# Patient Record
Sex: MALE | Race: OTHER | Employment: FULL TIME | URBAN - METROPOLITAN AREA
[De-identification: names, ages, dates, MRNs, and addresses within clinical notes are randomized per-mention and may not be internally consistent; named-entity substitution may affect disease eponyms.]

---

## 2023-07-28 ENCOUNTER — HOSPITAL ENCOUNTER (EMERGENCY)
Facility: HOSPITAL | Age: 33
Discharge: HOME | End: 2023-07-28
Attending: EMERGENCY MEDICINE
Payer: COMMERCIAL

## 2023-07-28 VITALS
TEMPERATURE: 98 F | DIASTOLIC BLOOD PRESSURE: 76 MMHG | SYSTOLIC BLOOD PRESSURE: 128 MMHG | RESPIRATION RATE: 18 BRPM | WEIGHT: 202 LBS | BODY MASS INDEX: 31.71 KG/M2 | HEART RATE: 71 BPM | HEIGHT: 67 IN | OXYGEN SATURATION: 98 %

## 2023-07-28 DIAGNOSIS — L03.317 CELLULITIS OF BUTTOCK: Primary | ICD-10-CM

## 2023-07-28 PROCEDURE — 99283 EMERGENCY DEPT VISIT LOW MDM: CPT

## 2023-07-28 RX ORDER — SULFAMETHOXAZOLE AND TRIMETHOPRIM 800; 160 MG/1; MG/1
1 TABLET ORAL 2 TIMES DAILY
Qty: 20 TABLET | Refills: 0 | Status: SHIPPED | OUTPATIENT
Start: 2023-07-28 | End: 2023-08-07

## 2024-02-15 ENCOUNTER — HOSPITAL ENCOUNTER (OUTPATIENT)
Age: 34
Discharge: HOME | End: 2024-02-15
Payer: COMMERCIAL

## 2024-02-15 VITALS
RESPIRATION RATE: 18 BRPM | DIASTOLIC BLOOD PRESSURE: 87 MMHG | OXYGEN SATURATION: 99 % | HEART RATE: 87 BPM | TEMPERATURE: 99 F | SYSTOLIC BLOOD PRESSURE: 128 MMHG

## 2024-02-15 DIAGNOSIS — J06.9 VIRAL URI WITH COUGH: ICD-10-CM

## 2024-02-15 DIAGNOSIS — H61.22 HEARING LOSS OF LEFT EAR DUE TO CERUMEN IMPACTION: ICD-10-CM

## 2024-02-15 DIAGNOSIS — Z20.822 ENCOUNTER FOR LABORATORY TESTING FOR COVID-19 VIRUS: ICD-10-CM

## 2024-02-15 DIAGNOSIS — J02.9 ACUTE VIRAL PHARYNGITIS: Primary | ICD-10-CM

## 2024-02-15 LAB
S PYO AG THROAT QL: NEGATIVE
SARS-COV-2 RNA RESP QL NAA+PROBE: NOT DETECTED

## 2024-02-15 PROCEDURE — 99203 OFFICE O/P NEW LOW 30 MIN: CPT | Performed by: PHYSICIAN ASSISTANT

## 2024-02-15 PROCEDURE — U0002 COVID-19 LAB TEST NON-CDC: HCPCS | Performed by: PHYSICIAN ASSISTANT

## 2024-02-15 PROCEDURE — 87880 STREP A ASSAY W/OPTIC: CPT | Performed by: PHYSICIAN ASSISTANT

## 2024-02-15 RX ORDER — BENZONATATE 200 MG/1
200 CAPSULE ORAL 3 TIMES DAILY PRN
Qty: 30 CAPSULE | Refills: 0 | Status: SHIPPED | OUTPATIENT
Start: 2024-02-15

## 2024-02-15 NOTE — ED PROVIDER NOTES
Patient Seen in: [facility]      History     Chief Complaint   Patient presents with    Cough    Sore Throat     Stated Complaint: itchy throat    Subjective:   HPI    Patient is a 33-year-old male, presenting to immediate care for evaluation of sore throat.  Onset: 3 days.  He has been experiencing cold-like symptoms.  Symptoms include slight headache with associated nasal congestion, runny nose, postnasal drip, itchy/inflamed throat, pain with swallowing, and nonproductive cough.  Has been taking over-the-counter cough medications for symptoms with minimal relief.  Come to immediate care for further evaluation.  No fevers.  No facial swelling.  No trismus or drooling.  No neck stiffness.  No chest pain or shortness of breath.  No weakness or confusion.  Not immunocompromise.  No recent travel    Objective:   History reviewed. No pertinent past medical history.           History reviewed. No pertinent surgical history.             Social History     Socioeconomic History    Marital status:    Tobacco Use    Smoking status: Former     Packs/day: 1     Types: Cigarettes    Smokeless tobacco: Never   Vaping Use    Vaping Use: Some days   Substance and Sexual Activity    Alcohol use: Yes     Alcohol/week: 0.0 standard drinks of alcohol     Comment: Socially    Drug use: No              Review of Systems   Constitutional:  Negative for fever.   HENT:  Positive for congestion, hearing loss, postnasal drip and sore throat.    Respiratory:  Positive for cough. Negative for shortness of breath.    Cardiovascular:  Negative for chest pain.   Gastrointestinal:  Negative for diarrhea and vomiting.   Musculoskeletal:  Negative for neck pain and neck stiffness.   Skin:  Negative for rash.   Allergic/Immunologic: Negative for immunocompromised state.   Neurological:  Negative for dizziness, weakness, light-headedness and headaches.   Psychiatric/Behavioral:  Negative for confusion.    All other systems  reviewed and are negative.      Positive for stated complaint: itchy throat  Other systems are as noted in HPI.  Constitutional and vital signs reviewed.      All other systems reviewed and negative except as noted above.    Physical Exam     ED Triage Vitals [02/15/24 0944]   /87   Pulse 87   Resp 18   Temp 99.3 °F (37.4 °C)   Temp src Temporal   SpO2 99 %   O2 Device None (Room air)       Current:/87   Pulse 87   Temp 99.3 °F (37.4 °C) (Temporal)   Resp 18   SpO2 99%         Physical Exam  Vitals and nursing note reviewed.   Constitutional:       General: He is not in acute distress.     Appearance: Normal appearance. He is well-developed. He is not ill-appearing, toxic-appearing or diaphoretic.   HENT:      Head: Normocephalic and atraumatic.      Right Ear: Tympanic membrane normal. No middle ear effusion. Tympanic membrane is not erythematous.      Ears:      Comments: Left Cerumen impaction.  Unable to visualize TM.  No ear canal redness or swelling or drainage.     Nose: Congestion and rhinorrhea present.      Comments: enlarged nasal turbinates bilaterally, nasal congestion, clear rhinorrhea, postnasal drip     Mouth/Throat:      Mouth: Mucous membranes are moist.      Tonsils: 1+ on the right. 1+ on the left.      Comments: Uvula midline.  Tonsils 1+ bilateral erythematous without exudates.  No trismus or drooling.  Postnasal drip  Eyes:      Conjunctiva/sclera: Conjunctivae normal.   Cardiovascular:      Rate and Rhythm: Normal rate and regular rhythm.      Pulses: Normal pulses.   Pulmonary:      Effort: Pulmonary effort is normal. No respiratory distress.      Breath sounds: Normal breath sounds.      Comments: lungs are clear to auscultation bilaterally  Musculoskeletal:         General: No deformity. Normal range of motion.      Cervical back: Normal range of motion. No rigidity.   Neurological:      General: No focal deficit present.      Mental Status: He is alert and oriented to  person, place, and time.      Motor: No weakness.      Gait: Gait normal.   Psychiatric:         Mood and Affect: Mood normal.         Behavior: Behavior normal.             ED Course     Labs Reviewed   POCT RAPID STREP - Normal   RAPID SARS-COV-2 BY PCR - Normal     Results for orders placed or performed during the hospital encounter of 02/15/24   Rapid SARS-CoV-2 by PCR    Collection Time: 02/15/24  9:46 AM    Specimen: Nares; Other   Result Value Ref Range    Rapid SARS-CoV-2 by PCR Not Detected Not Detected   POCT Rapid Strep    Collection Time: 02/15/24  9:56 AM   Result Value Ref Range    POCT Rapid Strep Negative Negative            MDM       Dx: Viral URI with cough and congestion, initial encounter  Onset: 3 days  Differential: Viral versus bacterial versus otitis media versus sinusitis versus pharyngitis versus strep versus influenza versus Clovid, pneumonia, etc.  Strep negative  COVID  testing negative  Overall well-appearing  Lungs clear  Hemodynamically stable  Afebrile  Tolerating PO  Outpatient management  Supportive care  Rest  Oral hydration  Motrin/Tylenol as needed for pain/fever/myalgia  OTC for Anti-tussive  Flonase nasal spray and oral antihistamine for congestion  Cepacol lozenges for sore throat  PCP follow  Return precaution  Discharge instructions viral URI    Dx: Cerumen impaction of ear, left, initial encounter  Exam: Cerumen impaction, unable to visualize TM  Ear irrigation performed by  tech  Reevaluation: Cerumen impaction resolved  No signs of otitis media, externa, TM perforation, foreign body, mastoiditis  Outpatient management  Supportive care  Avoid Q-tips  Plan for outpatient management  Supportive care  OTC  Debrox for earwax buildup prevention  Discharge instructions provided on earwax buildup  PCP follow-up    Start Taking               benzonatate 200 MG Oral Cap Take 1 capsule (200 mg total) by mouth 3 (three) times daily as needed for cough.              Medical Decision  Making      Disposition and Plan     Clinical Impression:  1. Acute viral pharyngitis    2. Hearing loss of left ear due to cerumen impaction    3. Encounter for laboratory testing for COVID-19 virus    4. Viral URI with cough         Disposition:  Discharge  2/15/2024 10:26 am    Follow-up:  Rafael Vega MD  [address] [address]  [phone]                Medications Prescribed:  Current Discharge Medication List        START taking these medications    Details   benzonatate 200 MG Oral Cap Take 1 capsule (200 mg total) by mouth 3 (three) times daily as needed for cough.  Qty: 30 capsule, Refills: 0

## 2024-02-15 NOTE — ED INITIAL ASSESSMENT (HPI)
Pt with congestion, inflamed throat that hurts to swallow, and headache since Tuesday; denies fever or n/v/d

## 2024-02-15 NOTE — DISCHARGE INSTRUCTIONS
Recommend over-the-counter medication and Cepacol lozenges for sore throat.  Flonase nasal spray and Allegra nasal/sinus congestion and postnasal drip/itchy throat.  May take dextromethorphan or Mucinex for cough

## (undated) NOTE — LETTER
Date & Time: 2/15/2024, 10:26 AM  Patient: Michael Lara  Encounter Provider(s):    Richie Rico PA       To Whom It May Concern:    Michael Lara was seen and treated in our department on 2/15/2024. He may return to work on Monday, February 19, 2024.  No restrictions.    If you have any questions or concerns, please do not hesitate to call.        _____________________________  Physician/APC Signature

## (undated) NOTE — LETTER
Date & Time: 7/28/2023, 8:54 AM  Patient: Fermin Storey  Encounter Provider(s):    Rach Lucio MD       To Whom It May Concern:    Fermin Storey was seen and treated in our department on 7/28/2023. He should not return to work until 8/1/23 .     If you have any questions or concerns, please do not hesitate to call.        _____________________________  Physician/APC Signature